# Patient Record
Sex: MALE | Race: WHITE
[De-identification: names, ages, dates, MRNs, and addresses within clinical notes are randomized per-mention and may not be internally consistent; named-entity substitution may affect disease eponyms.]

---

## 2024-01-24 ENCOUNTER — APPOINTMENT (OUTPATIENT)
Dept: ORTHOPEDIC SURGERY | Facility: CLINIC | Age: 64
End: 2024-01-24
Payer: COMMERCIAL

## 2024-01-24 VITALS — WEIGHT: 170 LBS | HEIGHT: 65 IN | BODY MASS INDEX: 28.32 KG/M2

## 2024-01-24 DIAGNOSIS — M79.18 MYALGIA, OTHER SITE: ICD-10-CM

## 2024-01-24 PROBLEM — Z00.00 ENCOUNTER FOR PREVENTIVE HEALTH EXAMINATION: Status: ACTIVE | Noted: 2024-01-24

## 2024-01-24 PROCEDURE — 99204 OFFICE O/P NEW MOD 45 MIN: CPT

## 2024-01-24 PROCEDURE — 73010 X-RAY EXAM OF SHOULDER BLADE: CPT | Mod: RT

## 2024-01-24 PROCEDURE — 73030 X-RAY EXAM OF SHOULDER: CPT | Mod: RT

## 2024-01-24 NOTE — HISTORY OF PRESENT ILLNESS
[de-identified] : 63 year old male  ( LHD , retired, works out) right shoulder pain since 1/23/24 while on the treadmill and fell on the shoulder  The pain is located anterior, lateral    The pain is associated with weakness  and trouble lifting Worse with activity and better at rest. Has tried ice, heat, Advil, Tylenol

## 2024-01-24 NOTE — ASSESSMENT
[FreeTextEntry1] : Right X-Ray Examination of the SHOULDER 2 views:  no fractures, subluxations or dislocations.  X-Ray Examination of the SCAPULA 1 or 2 views shows: there is an acromial spur  given their traumatic injury along with weakness on exam and positive devora testing, we will obtain an MRI to evaluate the integrity of the rotator cuff tissue and possible need for surgery  - The patient was advised of the diagnosis.  The natural history of the pathology was explained to the patient in layman's terms.  Several different treatment options were discussed and explained including the risks and benefits of both surgical and non-surgical treatments.  - Follow up after MRI to discuss results and further discuss treatment options. - In the meantime, the patient was advised to apply ice to the area daily, use anti-inflammatory medication, and let pain guide their activities.

## 2024-01-24 NOTE — IMAGING
[de-identified] :  RIGHT SHOULDER Inspection: No swelling.  Palpation: Tenderness is noted at the bicipital groove, anterior and lateral.  Range of motion: There is pain with range of motion. , ER 55, @90ER 90, @90IR 30 Strength: There is pain, weakness, and discomfort with strength testing. Forward Flexion 3/5. Abduction 3/5.  External Rotation 4/5 and Internal Rotation 5-/5  Neurological testings: motor and sensor intact distally. Ligament Stability and Special Tests:  There is positive arc of pain.  Shoulder apprehension: neg Shoulder relocation: neg Obriens test: pos Biceps Active test: neg Garcia Labral Shear: neg Impingement testing: pos Heriberto testing: pos Whipple: pos Cross Body Adduction: neg

## 2024-01-25 ENCOUNTER — APPOINTMENT (OUTPATIENT)
Dept: MRI IMAGING | Facility: CLINIC | Age: 64
End: 2024-01-25
Payer: COMMERCIAL

## 2024-01-25 PROCEDURE — 73221 MRI JOINT UPR EXTREM W/O DYE: CPT | Mod: RT

## 2024-02-01 ENCOUNTER — APPOINTMENT (OUTPATIENT)
Dept: ORTHOPEDIC SURGERY | Facility: CLINIC | Age: 64
End: 2024-02-01
Payer: COMMERCIAL

## 2024-02-01 PROCEDURE — 99214 OFFICE O/P EST MOD 30 MIN: CPT

## 2024-02-01 NOTE — HISTORY OF PRESENT ILLNESS
[de-identified] : 63 year old male  ( LHD , retired, works out) right shoulder pain since 1/23/24 while on the treadmill and fell on the shoulder  The pain is located anterior, lateral    The pain is associated with weakness  and trouble lifting Worse with activity and better at rest. Has tried ice, heat, Advil, Tylenol   2/1/24 - had mri showing large RTC tearing, cont weakness

## 2024-02-01 NOTE — IMAGING
[de-identified] :  RIGHT SHOULDER Inspection: No swelling.  Palpation: Tenderness is noted at the bicipital groove, anterior and lateral.  Range of motion: There is pain with range of motion. , ER 55, @90ER 90, @90IR 30 Strength: There is pain, weakness, and discomfort with strength testing. Forward Flexion 3/5. Abduction 3/5.  External Rotation 4/5 and Internal Rotation 5-/5  Neurological testings: motor and sensor intact distally. Ligament Stability and Special Tests:  There is positive arc of pain.  Shoulder apprehension: neg Shoulder relocation: neg Obriens test: pos Biceps Active test: neg Garcia Labral Shear: neg Impingement testing: pos Heriberto testing: pos Whipple: pos Cross Body Adduction: neg

## 2024-02-01 NOTE — IMAGING
[de-identified] :  RIGHT SHOULDER Inspection: No swelling.  Palpation: Tenderness is noted at the bicipital groove, anterior and lateral.  Range of motion: There is pain with range of motion. , ER 55, @90ER 90, @90IR 30 Strength: There is pain, weakness, and discomfort with strength testing. Forward Flexion 3/5. Abduction 3/5.  External Rotation 4/5 and Internal Rotation 5-/5  Neurological testings: motor and sensor intact distally. Ligament Stability and Special Tests:  There is positive arc of pain.  Shoulder apprehension: neg Shoulder relocation: neg Obriens test: pos Biceps Active test: neg Garcia Labral Shear: neg Impingement testing: pos Heriberto testing: pos Whipple: pos Cross Body Adduction: neg

## 2024-02-01 NOTE — HISTORY OF PRESENT ILLNESS
[de-identified] : 63 year old male  ( LHD , retired, works out) right shoulder pain since 1/23/24 while on the treadmill and fell on the shoulder  The pain is located anterior, lateral    The pain is associated with weakness  and trouble lifting Worse with activity and better at rest. Has tried ice, heat, Advil, Tylenol   2/1/24 - had mri showing large RTC tearing, cont weakness

## 2024-02-01 NOTE — HISTORY OF PRESENT ILLNESS
[de-identified] : 63 year old male  ( LHD , retired, works out) right shoulder pain since 1/23/24 while on the treadmill and fell on the shoulder  The pain is located anterior, lateral    The pain is associated with weakness  and trouble lifting Worse with activity and better at rest. Has tried ice, heat, Advil, Tylenol   2/1/24 - had mri showing large RTC tearing, cont weakness

## 2024-02-01 NOTE — IMAGING
[de-identified] :  RIGHT SHOULDER Inspection: No swelling.  Palpation: Tenderness is noted at the bicipital groove, anterior and lateral.  Range of motion: There is pain with range of motion. , ER 55, @90ER 90, @90IR 30 Strength: There is pain, weakness, and discomfort with strength testing. Forward Flexion 3/5. Abduction 3/5.  External Rotation 4/5 and Internal Rotation 5-/5  Neurological testings: motor and sensor intact distally. Ligament Stability and Special Tests:  There is positive arc of pain.  Shoulder apprehension: neg Shoulder relocation: neg Obriens test: pos Biceps Active test: neg Garcia Labral Shear: neg Impingement testing: pos Heriberto testing: pos Whipple: pos Cross Body Adduction: neg

## 2024-02-01 NOTE — ASSESSMENT
[FreeTextEntry1] : mri right shoulder 1/25/24 - large retracted RTC tear with some atrophy, medial sublux bicep, labral tearing   - We discussed their diagnosis and treatment options at length including the risks and benefits of both surgical and non-surgical options. - Given their symptoms of pain and weakness along with the large nature of their tear, they are a surgical candidate.  - The risks, benefits, and alternatives to R RCR vs RTSA were discussed at length, discussed may not be repairable and rec f/u with adrianaidiana replacment septracyai

## 2024-02-01 NOTE — IMAGING
[de-identified] :  RIGHT SHOULDER Inspection: No swelling.  Palpation: Tenderness is noted at the bicipital groove, anterior and lateral.  Range of motion: There is pain with range of motion. , ER 55, @90ER 90, @90IR 30 Strength: There is pain, weakness, and discomfort with strength testing. Forward Flexion 3/5. Abduction 3/5.  External Rotation 4/5 and Internal Rotation 5-/5  Neurological testings: motor and sensor intact distally. Ligament Stability and Special Tests:  There is positive arc of pain.  Shoulder apprehension: neg Shoulder relocation: neg Obriens test: pos Biceps Active test: neg Garcia Labral Shear: neg Impingement testing: pos Heriberto testing: pos Whipple: pos Cross Body Adduction: neg

## 2024-02-01 NOTE — HISTORY OF PRESENT ILLNESS
[de-identified] : 63 year old male  ( LHD , retired, works out) right shoulder pain since 1/23/24 while on the treadmill and fell on the shoulder  The pain is located anterior, lateral    The pain is associated with weakness  and trouble lifting Worse with activity and better at rest. Has tried ice, heat, Advil, Tylenol   2/1/24 - had mri showing large RTC tearing, cont weakness

## 2024-02-23 ENCOUNTER — APPOINTMENT (OUTPATIENT)
Dept: ORTHOPEDIC SURGERY | Facility: CLINIC | Age: 64
End: 2024-02-23
Payer: COMMERCIAL

## 2024-02-23 DIAGNOSIS — S46.011A STRAIN OF MUSCLE(S) AND TENDON(S) OF THE ROTATOR CUFF OF RIGHT SHOULDER, INITIAL ENCOUNTER: ICD-10-CM

## 2024-02-23 PROCEDURE — 99204 OFFICE O/P NEW MOD 45 MIN: CPT

## 2024-02-23 NOTE — DATA REVIEWED
[MRI] : MRI [Right] : of the right [Shoulder] : shoulder [Report was reviewed and noted in the chart] : The report was reviewed and noted in the chart [I independently reviewed and interpreted images and report] : I independently reviewed and interpreted images and report [I reviewed the films/CD] : I reviewed the films/CD [FreeTextEntry1] : [facility] 1/25/24:  1. Large retracted tears of the supraspinatus and infraspinatus tendons with mild supraspinatus and infraspinatus muscle atrophy and superior displaced humeral head with narrowing of the supraspinatus outlet 2. Moderate partial tearing of the subscapularis tendon insertion 3. Moderate biceps tenosynovitis with subluxation medially 4. Circumferential labral tearing and large GH joint effusion 5. Moderate AC joint arthrosis and lateral acromial bone spurs 6. Soft tissue swelling and muscle strains surrounding the shoulder 7. No acute fx

## 2024-02-23 NOTE — HISTORY OF PRESENT ILLNESS
[de-identified] : 2/23/24: Pt was initially seen by dr hutton after falling on a treadmill at the gym in the beginning of this month. pt had an mri done and was told there is a rtc tear and he might need a possible shoulder replacement and to follow up with us for further eval. Patient reports longstanding right shoulder issues and has been treated in the past  occ: retired PMHx: MS

## 2024-02-23 NOTE — IMAGING
[de-identified] :  RIGHT SHOULDER Inspection: No swelling.  Palpation: Tenderness is noted at the bicipital groove, anterior and lateral.  Range of motion: There is pain with range of motion. , ER 55, @90ER 90, @90IR 30 Strength: There is pain, weakness, and discomfort with strength testing. Forward Flexion 3/5. Abduction 3/5.  External Rotation 4/5 and Internal Rotation 5-/5  Neurological testings: motor and sensor intact distally. Ligament Stability and Special Tests:  There is positive arc of pain.  Shoulder apprehension: neg Shoulder relocation: neg Obriens test: pos Biceps Active test: neg Garcia Labral Shear: neg Impingement testing: pos Heriberto testing: pos Whipple: pos Cross Body Adduction: neg

## 2024-03-12 ENCOUNTER — TRANSCRIPTION ENCOUNTER (OUTPATIENT)
Age: 64
End: 2024-03-12

## 2024-04-11 ENCOUNTER — RX RENEWAL (OUTPATIENT)
Age: 64
End: 2024-04-11

## 2024-05-24 ENCOUNTER — APPOINTMENT (OUTPATIENT)
Dept: ORTHOPEDIC SURGERY | Facility: CLINIC | Age: 64
End: 2024-05-24
Payer: COMMERCIAL

## 2024-05-24 DIAGNOSIS — M12.811 UNSPECIFIED ROTATOR CUFF TEAR OR RUPTURE OF RIGHT SHOULDER, NOT SPECIFIED AS TRAUMATIC: ICD-10-CM

## 2024-05-24 DIAGNOSIS — M75.101 UNSPECIFIED ROTATOR CUFF TEAR OR RUPTURE OF RIGHT SHOULDER, NOT SPECIFIED AS TRAUMATIC: ICD-10-CM

## 2024-05-24 DIAGNOSIS — S46.011D STRAIN OF MUSCLE(S) AND TENDON(S) OF THE ROTATOR CUFF OF RIGHT SHOULDER, SUBSEQUENT ENCOUNTER: ICD-10-CM

## 2024-05-24 PROCEDURE — 99214 OFFICE O/P EST MOD 30 MIN: CPT

## 2024-06-04 PROBLEM — S46.011D TRAUMATIC COMPLETE TEAR OF RIGHT ROTATOR CUFF, SUBSEQUENT ENCOUNTER: Status: ACTIVE | Noted: 2024-06-04

## 2024-06-04 PROBLEM — M75.101 ROTATOR CUFF TEAR ARTHROPATHY OF RIGHT SHOULDER: Status: ACTIVE | Noted: 2024-06-04

## 2024-06-04 NOTE — IMAGING
[de-identified] :  RIGHT SHOULDER Inspection: No swelling.  Palpation: Tenderness is noted at the bicipital groove, anterior and lateral.  Range of motion: There is pain with range of motion. , ER 55, @90ER 90, @90IR 30 Strength: There is pain, weakness, and discomfort with strength testing. Forward Flexion 3/5. Abduction 3/5.  External Rotation 4/5 and Internal Rotation 5-/5  Neurological testings: motor and sensor intact distally. Ligament Stability and Special Tests:  There is positive arc of pain.  Shoulder apprehension: neg Shoulder relocation: neg Obriens test: pos Biceps Active test: neg Garcia Labral Shear: neg Impingement testing: pos Heriberto testing: pos Whipple: pos Cross Body Adduction: neg

## 2024-06-04 NOTE — HISTORY OF PRESENT ILLNESS
[de-identified] : 2/23/24: Pt was initially seen by dr hutton after falling on a treadmill at the gym in the beginning of this month. pt had an mri done and was told there is a rtc tear and he might need a possible shoulder replacement and to follow up with us for further eval. Patient reports longstanding right shoulder issues and has been treated in the past  05/24/24 - pt is here for RT shoulder pain. States pain/symptoms has improved since initial injury. Performs exercises at home.   occ: retired PMHx: MS

## 2024-06-04 NOTE — DISCUSSION/SUMMARY
[de-identified] : Assessment:   The patient presents with history, examination and imaging that are most consistent with a diagnosis of:  Traumatic complete rotator cuff tear of right shoulder   The patient would like to pursue conservative measures at this time. After consideration of various non-operative treatment modalities, the patient would like to proceed with the modalities listed below.   During this appointment the patient was examined, diagnoses were discussed and explained in a face to face manner. In addition extensive time was spent reviewing aforementioned diagnostic studies. Counseling including abnormal image results, differential diagnoses, treatment options, risk and benefits, lifestyle changes, current condition, and current medications was performed. Patient's comments, questions, and concerns were address and patient verbalized understanding.   ---------------------------     Plan: - Discussed non-operative and operative treatment options including Reverse Total Shoulder Replacement vs Rotator Cuff Repair.  All questions and concerns regarding the surgery were addressed. Considering patient has appropriate ROM and strength, discussed that surgical treatment is not urgent at this time. Patient elected to continue with conservative management   Lengthy discussion was had with the patient regarding their options.  Patient understands that not having the rotator cuff repaired in a timely fashion will possibly worsen their chances of having a good outcome.  They also understand that the cuff may not even be repairable if they delay surgery.  Pt understands and all questions were answered.   - Discussed Physical Therapy, patient declined    Follow-up:  PRN

## 2024-06-21 ENCOUNTER — RX RENEWAL (OUTPATIENT)
Age: 64
End: 2024-06-21

## 2024-06-21 RX ORDER — NAPROXEN 500 MG/1
500 TABLET ORAL
Qty: 60 | Refills: 0 | Status: ACTIVE | COMMUNITY
Start: 2024-01-24 | End: 1900-01-01

## 2025-04-18 ENCOUNTER — APPOINTMENT (OUTPATIENT)
Dept: ORTHOPEDIC SURGERY | Facility: CLINIC | Age: 65
End: 2025-04-18

## 2025-04-18 DIAGNOSIS — M43.06 SPONDYLOLYSIS, LUMBAR REGION: ICD-10-CM

## 2025-04-18 DIAGNOSIS — M43.17 SPONDYLOLISTHESIS, LUMBOSACRAL REGION: ICD-10-CM

## 2025-04-18 DIAGNOSIS — M54.16 RADICULOPATHY, LUMBAR REGION: ICD-10-CM

## 2025-04-18 PROCEDURE — 72110 X-RAY EXAM L-2 SPINE 4/>VWS: CPT

## 2025-04-18 PROCEDURE — 99204 OFFICE O/P NEW MOD 45 MIN: CPT

## 2025-04-18 PROCEDURE — 72170 X-RAY EXAM OF PELVIS: CPT

## 2025-04-18 RX ORDER — MELOXICAM 15 MG/1
15 TABLET ORAL DAILY
Qty: 30 | Refills: 1 | Status: ACTIVE | COMMUNITY
Start: 2025-04-18 | End: 1900-01-01

## 2025-04-18 RX ORDER — GABAPENTIN 100 MG/1
100 CAPSULE ORAL
Qty: 60 | Refills: 1 | Status: ACTIVE | COMMUNITY
Start: 2025-04-18 | End: 1900-01-01

## 2025-05-30 ENCOUNTER — APPOINTMENT (OUTPATIENT)
Dept: ORTHOPEDIC SURGERY | Facility: CLINIC | Age: 65
End: 2025-05-30